# Patient Record
Sex: FEMALE | Race: BLACK OR AFRICAN AMERICAN
[De-identification: names, ages, dates, MRNs, and addresses within clinical notes are randomized per-mention and may not be internally consistent; named-entity substitution may affect disease eponyms.]

---

## 2024-01-09 ENCOUNTER — APPOINTMENT (OUTPATIENT)
Dept: ORTHOPEDIC SURGERY | Facility: CLINIC | Age: 49
End: 2024-01-09
Payer: MEDICAID

## 2024-01-09 VITALS — BODY MASS INDEX: 31.89 KG/M2 | WEIGHT: 180 LBS | HEIGHT: 63 IN

## 2024-01-09 PROBLEM — Z00.00 ENCOUNTER FOR PREVENTIVE HEALTH EXAMINATION: Status: ACTIVE | Noted: 2024-01-09

## 2024-01-09 PROCEDURE — 99204 OFFICE O/P NEW MOD 45 MIN: CPT

## 2024-01-09 RX ORDER — TRAMADOL HYDROCHLORIDE 50 MG/1
50 TABLET, COATED ORAL
Qty: 30 | Refills: 0 | Status: ACTIVE | COMMUNITY
Start: 2024-01-09 | End: 1900-01-01

## 2024-01-30 ENCOUNTER — APPOINTMENT (OUTPATIENT)
Dept: ORTHOPEDIC SURGERY | Facility: CLINIC | Age: 49
End: 2024-01-30
Payer: MEDICAID

## 2024-01-30 VITALS — BODY MASS INDEX: 31.89 KG/M2 | HEIGHT: 63 IN | WEIGHT: 180 LBS

## 2024-01-30 PROCEDURE — 99213 OFFICE O/P EST LOW 20 MIN: CPT

## 2024-01-30 NOTE — REASON FOR VISIT
[FreeTextEntry2] : 1/30/24: Follow up for right shoulder. She is attending PT and has noticed improvement. She did not  the Tramadol. She is taking Tylenol for pain. There was no recent injury.

## 2024-01-30 NOTE — HISTORY OF PRESENT ILLNESS
[Dull/Aching] : dull/aching [Intermittent] : intermittent [de-identified] : Had a fall putting away marily[employer] decorations 2 days ago, right shoulder dislocated but she didn't have to have it set. She went to ER, had CT done showing Hill- Sachs fx, and bony bankart avulsion. Has been in sling [7] : 7 [5] : 5 [Sharp] : sharp [Nothing helps with pain getting better] : Nothing helps with pain getting better [] : no [FreeTextEntry5] : PT states 2 days ago her shoulder dislocated and relocated on its own. Pt went to [facility] and X-rays and CT taken up her upper Extremity and shoulder. Pt is in pain and her arm is in a sling. [de-identified] : CT and X-rays

## 2024-01-30 NOTE — PHYSICAL EXAM
[Right] : right shoulder [3 ___] : forward flexion 3[unfilled]/5 [3___] : external rotation 3[unfilled]/5 [] : motor and sensory intact distally [FreeTextEntry8] : improved [TWNoteComboBox7] : active forward flexion 120 degrees [de-identified] : active abduction 95 degrees

## 2024-02-27 ENCOUNTER — APPOINTMENT (OUTPATIENT)
Dept: ORTHOPEDIC SURGERY | Facility: CLINIC | Age: 49
End: 2024-02-27
Payer: MEDICAID

## 2024-02-27 VITALS — BODY MASS INDEX: 31.89 KG/M2 | HEIGHT: 63 IN | WEIGHT: 180 LBS

## 2024-02-27 DIAGNOSIS — S42.291A OTHER DISPLACED FRACTURE OF UPPER END OF RIGHT HUMERUS, INITIAL ENCOUNTER FOR CLOSED FRACTURE: ICD-10-CM

## 2024-02-27 DIAGNOSIS — S43.004A UNSPECIFIED DISLOCATION OF RIGHT SHOULDER JOINT, INITIAL ENCOUNTER: ICD-10-CM

## 2024-02-27 PROCEDURE — 99213 OFFICE O/P EST LOW 20 MIN: CPT

## 2024-02-27 NOTE — HISTORY OF PRESENT ILLNESS
[7] : 7 [5] : 5 [Dull/Aching] : dull/aching [Sharp] : sharp [Intermittent] : intermittent [Nothing helps with pain getting better] : Nothing helps with pain getting better [Sleep] : sleep [de-identified] : Had a fall putting away marilyn decorations 2 days ago, right shoulder dislocated but she didn't have to have it set. She went to ER, had CT done showing Hill- Sachs fx, and bony bankart avulsion. Has been in sling [] : no [FreeTextEntry5] : PT states 2 days ago her shoulder dislocated and relocated on its own. Pt went to [facility] and X-rays and CT taken up her upper Extremity and shoulder. Pt is in pain and her arm is in a sling. [de-identified] : CT and X-rays

## 2024-02-27 NOTE — REASON FOR VISIT
[FreeTextEntry2] : 02/27/2024 Doing PT for right shoulder, making progress, cannot sleep on it yet, cannot lift heavy things yet 1/30/24: Follow up for right shoulder. She is attending PT and has noticed improvement. She did not  the Tramadol. She is taking Tylenol for pain. There was no recent injury.

## 2024-02-27 NOTE — PHYSICAL EXAM
[Right] : right shoulder [4 ___] : forward flexion 4[unfilled]/5 [4___] : external rotation 4[unfilled]/5 [] : motor and sensory intact distally [FreeTextEntry8] : improved [TWNoteComboBox7] : active forward flexion 145 degrees [de-identified] : active abduction 115 degrees

## 2024-04-09 ENCOUNTER — APPOINTMENT (OUTPATIENT)
Dept: ORTHOPEDIC SURGERY | Facility: CLINIC | Age: 49
End: 2024-04-09